# Patient Record
Sex: FEMALE | Race: BLACK OR AFRICAN AMERICAN | NOT HISPANIC OR LATINO | URBAN - METROPOLITAN AREA
[De-identification: names, ages, dates, MRNs, and addresses within clinical notes are randomized per-mention and may not be internally consistent; named-entity substitution may affect disease eponyms.]

---

## 2018-11-29 ENCOUNTER — APPOINTMENT (RX ONLY)
Dept: URBAN - METROPOLITAN AREA CLINIC 352 | Facility: CLINIC | Age: 23
Setting detail: DERMATOLOGY
End: 2018-11-29

## 2018-11-29 DIAGNOSIS — Q89.2 CONGENITAL MALFORMATIONS OF OTHER ENDOCRINE GLANDS: ICD-10-CM

## 2018-11-29 PROBLEM — H91.90 UNSPECIFIED HEARING LOSS, UNSPECIFIED EAR: Status: ACTIVE | Noted: 2018-11-29

## 2018-11-29 PROBLEM — D48.5 NEOPLASM OF UNCERTAIN BEHAVIOR OF SKIN: Status: ACTIVE | Noted: 2018-11-29

## 2018-11-29 PROBLEM — D56.9 THALASSEMIA, UNSPECIFIED: Status: ACTIVE | Noted: 2018-11-29

## 2018-11-29 PROBLEM — F32.9 MAJOR DEPRESSIVE DISORDER, SINGLE EPISODE, UNSPECIFIED: Status: ACTIVE | Noted: 2018-11-29

## 2018-11-29 PROCEDURE — ? CARE COORDINATION

## 2018-11-29 PROCEDURE — 99201: CPT

## 2018-11-29 PROCEDURE — ? COUNSELING

## 2018-11-29 NOTE — PROCEDURE: CARE COORDINATION
Detail Level: Zone
Name Of Other External Physician Care Discussed With: Dr. Gil[facility] or [facility]

## 2019-05-06 ENCOUNTER — APPOINTMENT (RX ONLY)
Dept: URBAN - METROPOLITAN AREA CLINIC 90 | Facility: CLINIC | Age: 24
Setting detail: DERMATOLOGY
End: 2019-05-06

## 2019-05-06 DIAGNOSIS — D49.2 NEOPLASM OF UNSPECIFIED BEHAVIOR OF BONE, SOFT TISSUE, AND SKIN: ICD-10-CM

## 2019-05-06 PROCEDURE — 99213 OFFICE O/P EST LOW 20 MIN: CPT

## 2019-05-06 PROCEDURE — ? ADDITIONAL NOTES

## 2019-05-06 ASSESSMENT — LOCATION SIMPLE DESCRIPTION DERM: LOCATION SIMPLE: CHEST

## 2019-05-06 ASSESSMENT — LOCATION DETAILED DESCRIPTION DERM: LOCATION DETAILED: RIGHT MEDIAL SUPERIOR CHEST

## 2019-05-06 ASSESSMENT — LOCATION ZONE DERM: LOCATION ZONE: TRUNK

## 2019-05-06 NOTE — PROCEDURE: ADDITIONAL NOTES
Additional Notes: This nodule was originally operated on by [facility] but removal was unsuccessful (per pt/no records available).  Pt was then evaluated by Dr Rivas (2018) and was sent to be surgically managed by Dr Gilmar Solares -[place], [place].  Pt states that she did see Dr Solares but was told that she should come back when the Lesion was symptomatic.   \\n\\nI have instructed her to see Dr Solares’s office within the next 24 hours and to continue the Clindamycin q6’ as rx’d by the ER Saturday. Additional Notes: This nodule was originally operated on by [facility] but removal was unsuccessful (per pt/no records available).  Pt was then evaluated by Dr Rivas (2018) and was sent to be surgically managed by Dr Gilmar Solares -[place], [place].  Pt states that she did see Dr Solares but was told that she should come back when the Lesion was symptomatic.   \\n\\nI have instructed her to see Dr Solares’s office within the next 24 hours and to continue the Clindamycin q6’ as rx’d by the ER Saturday.

## 2019-05-06 NOTE — HPI: SKIN LESIONS
Is This A New Presentation, Or A Follow-Up?: Skin Lesion
Additional History: Patient had exc on area 1 year ago at previous dermatologist, patient went to ER Saturday and they attempted to drain area and nothing came out, they gave patient clindamycin 300mg q 6 hours.